# Patient Record
Sex: MALE | Race: WHITE | NOT HISPANIC OR LATINO
[De-identification: names, ages, dates, MRNs, and addresses within clinical notes are randomized per-mention and may not be internally consistent; named-entity substitution may affect disease eponyms.]

---

## 2017-01-23 ENCOUNTER — RX RENEWAL (OUTPATIENT)
Age: 72
End: 2017-01-23

## 2017-02-23 ENCOUNTER — RESULT REVIEW (OUTPATIENT)
Age: 72
End: 2017-02-23

## 2017-03-06 ENCOUNTER — APPOINTMENT (OUTPATIENT)
Dept: FAMILY MEDICINE | Facility: CLINIC | Age: 72
End: 2017-03-06

## 2017-03-06 VITALS
OXYGEN SATURATION: 98 % | DIASTOLIC BLOOD PRESSURE: 80 MMHG | HEIGHT: 66 IN | HEART RATE: 60 BPM | BODY MASS INDEX: 33.77 KG/M2 | SYSTOLIC BLOOD PRESSURE: 158 MMHG | RESPIRATION RATE: 16 BRPM | WEIGHT: 210.13 LBS

## 2017-03-06 VITALS — SYSTOLIC BLOOD PRESSURE: 180 MMHG | DIASTOLIC BLOOD PRESSURE: 100 MMHG

## 2017-04-07 ENCOUNTER — CLINICAL ADVICE (OUTPATIENT)
Age: 72
End: 2017-04-07

## 2017-04-27 ENCOUNTER — RX RENEWAL (OUTPATIENT)
Age: 72
End: 2017-04-27

## 2017-05-15 ENCOUNTER — RX RENEWAL (OUTPATIENT)
Age: 72
End: 2017-05-15

## 2017-05-16 ENCOUNTER — RX RENEWAL (OUTPATIENT)
Age: 72
End: 2017-05-16

## 2017-06-12 ENCOUNTER — NON-APPOINTMENT (OUTPATIENT)
Age: 72
End: 2017-06-12

## 2017-06-12 ENCOUNTER — APPOINTMENT (OUTPATIENT)
Dept: FAMILY MEDICINE | Facility: CLINIC | Age: 72
End: 2017-06-12

## 2017-06-12 VITALS
WEIGHT: 205 LBS | HEART RATE: 76 BPM | SYSTOLIC BLOOD PRESSURE: 130 MMHG | HEIGHT: 66 IN | RESPIRATION RATE: 16 BRPM | DIASTOLIC BLOOD PRESSURE: 72 MMHG | OXYGEN SATURATION: 98 % | BODY MASS INDEX: 32.95 KG/M2

## 2017-06-12 DIAGNOSIS — Z23 ENCOUNTER FOR IMMUNIZATION: ICD-10-CM

## 2017-06-12 RX ORDER — AMLODIPINE BESYLATE 5 MG/1
5 TABLET ORAL
Qty: 30 | Refills: 0 | Status: COMPLETED | COMMUNITY
Start: 2017-03-06 | End: 2017-06-12

## 2017-06-22 LAB — HEMOCCULT STL QL IA: NEGATIVE

## 2017-07-21 ENCOUNTER — RX RENEWAL (OUTPATIENT)
Age: 72
End: 2017-07-21

## 2017-10-03 ENCOUNTER — RX RENEWAL (OUTPATIENT)
Age: 72
End: 2017-10-03

## 2017-10-20 ENCOUNTER — RX RENEWAL (OUTPATIENT)
Age: 72
End: 2017-10-20

## 2017-11-20 ENCOUNTER — RX RENEWAL (OUTPATIENT)
Age: 72
End: 2017-11-20

## 2017-11-27 ENCOUNTER — APPOINTMENT (OUTPATIENT)
Dept: FAMILY MEDICINE | Facility: CLINIC | Age: 72
End: 2017-11-27
Payer: MEDICARE

## 2017-11-27 VITALS — DIASTOLIC BLOOD PRESSURE: 92 MMHG | SYSTOLIC BLOOD PRESSURE: 170 MMHG

## 2017-11-27 VITALS
HEIGHT: 66 IN | HEART RATE: 71 BPM | BODY MASS INDEX: 34.07 KG/M2 | RESPIRATION RATE: 16 BRPM | WEIGHT: 212 LBS | OXYGEN SATURATION: 98 % | SYSTOLIC BLOOD PRESSURE: 172 MMHG | DIASTOLIC BLOOD PRESSURE: 84 MMHG

## 2017-11-27 DIAGNOSIS — Z23 ENCOUNTER FOR IMMUNIZATION: ICD-10-CM

## 2017-11-27 PROCEDURE — 90732 PPSV23 VACC 2 YRS+ SUBQ/IM: CPT

## 2017-11-27 PROCEDURE — G0009: CPT

## 2017-11-27 PROCEDURE — 99214 OFFICE O/P EST MOD 30 MIN: CPT | Mod: 25

## 2017-11-27 RX ORDER — SIMVASTATIN 40 MG/1
40 TABLET, FILM COATED ORAL
Qty: 90 | Refills: 0 | Status: DISCONTINUED | COMMUNITY
Start: 2016-07-07 | End: 2017-11-27

## 2017-11-27 RX ORDER — INSULIN GLARGINE 100 [IU]/ML
100 INJECTION, SOLUTION SUBCUTANEOUS
Refills: 0 | Status: ACTIVE | COMMUNITY

## 2017-11-27 RX ORDER — INSULIN ASPART 100 [IU]/ML
100 INJECTION, SOLUTION INTRAVENOUS; SUBCUTANEOUS
Refills: 0 | Status: ACTIVE | COMMUNITY

## 2017-12-15 ENCOUNTER — APPOINTMENT (OUTPATIENT)
Dept: FAMILY MEDICINE | Facility: CLINIC | Age: 72
End: 2017-12-15
Payer: MEDICARE

## 2017-12-15 VITALS — SYSTOLIC BLOOD PRESSURE: 152 MMHG | DIASTOLIC BLOOD PRESSURE: 82 MMHG

## 2017-12-15 VITALS
BODY MASS INDEX: 34.55 KG/M2 | HEIGHT: 66 IN | SYSTOLIC BLOOD PRESSURE: 150 MMHG | WEIGHT: 215 LBS | DIASTOLIC BLOOD PRESSURE: 90 MMHG

## 2017-12-15 PROCEDURE — 99214 OFFICE O/P EST MOD 30 MIN: CPT

## 2018-03-19 ENCOUNTER — RX RENEWAL (OUTPATIENT)
Age: 73
End: 2018-03-19

## 2018-06-19 ENCOUNTER — APPOINTMENT (OUTPATIENT)
Dept: FAMILY MEDICINE | Facility: CLINIC | Age: 73
End: 2018-06-19
Payer: MEDICARE

## 2018-06-19 VITALS
HEIGHT: 66 IN | DIASTOLIC BLOOD PRESSURE: 82 MMHG | SYSTOLIC BLOOD PRESSURE: 148 MMHG | WEIGHT: 216 LBS | BODY MASS INDEX: 34.72 KG/M2

## 2018-06-19 DIAGNOSIS — N50.89 OTHER SPECIFIED DISORDERS OF THE MALE GENITAL ORGANS: ICD-10-CM

## 2018-06-19 PROCEDURE — 99214 OFFICE O/P EST MOD 30 MIN: CPT

## 2018-06-19 NOTE — HISTORY OF PRESENT ILLNESS
[de-identified] : Pt for f/u DM, htn, gout. Glucose usually <130s, improved from past few weeks since he started eating more celery during day. [facility] Last [facility] check 2 yrs ago. Pt has run out amlodipine, BP elevated today. Pt has not had gouty attack recently.\par Pt c/o fatty mass on R testicle. Denies pain.

## 2018-06-19 NOTE — ASSESSMENT
[FreeTextEntry1] : htn- not at goal, restart amlodipine 10mg q day. f/u for repeat BP check\par DM- stable, cont meds. carb controlled diet. [facility]\par HLD - refill statin, check flp, hepatic function\par gout - refill allopurinol check uric acid\par R testicle swelling - likely benign, pt [facility]

## 2018-09-20 ENCOUNTER — APPOINTMENT (OUTPATIENT)
Dept: FAMILY MEDICINE | Facility: CLINIC | Age: 73
End: 2018-09-20
Payer: MEDICARE

## 2018-09-20 VITALS
OXYGEN SATURATION: 97 % | WEIGHT: 219.13 LBS | BODY MASS INDEX: 35.22 KG/M2 | HEART RATE: 72 BPM | SYSTOLIC BLOOD PRESSURE: 140 MMHG | HEIGHT: 66 IN | DIASTOLIC BLOOD PRESSURE: 68 MMHG

## 2018-09-20 DIAGNOSIS — Z23 ENCOUNTER FOR IMMUNIZATION: ICD-10-CM

## 2018-09-20 PROCEDURE — 99214 OFFICE O/P EST MOD 30 MIN: CPT | Mod: 25

## 2018-09-20 PROCEDURE — 90662 IIV NO PRSV INCREASED AG IM: CPT

## 2018-09-20 PROCEDURE — G0008: CPT

## 2018-09-23 NOTE — ASSESSMENT
[FreeTextEntry1] : gout - refill allopurinol\par HLD - refill statin\par htn- refill norvasc, lisinopril\par Risks and benefits of high dose flu vaccine discussed w/ pt. Consent given by pt, high dose flu vaccine administered. Pt tolerated well\par

## 2018-09-23 NOTE — HISTORY OF PRESENT ILLNESS
[de-identified] : Pt for f/u hld, htn, gout. Pt feels well needs refill of meds. Denies CP, palpitations, dyspnea, n/v\par Pt sometimes feels sporadic "tightness" in L leg first noticed a few weeks ago.

## 2018-12-04 ENCOUNTER — RX CHANGE (OUTPATIENT)
Age: 73
End: 2018-12-04

## 2018-12-13 ENCOUNTER — APPOINTMENT (OUTPATIENT)
Dept: FAMILY MEDICINE | Facility: CLINIC | Age: 73
End: 2018-12-13
Payer: MEDICARE

## 2018-12-13 VITALS
SYSTOLIC BLOOD PRESSURE: 148 MMHG | TEMPERATURE: 98.6 F | OXYGEN SATURATION: 96 % | WEIGHT: 220 LBS | HEIGHT: 66 IN | RESPIRATION RATE: 16 BRPM | HEART RATE: 97 BPM | BODY MASS INDEX: 35.36 KG/M2 | DIASTOLIC BLOOD PRESSURE: 78 MMHG

## 2018-12-13 PROCEDURE — 99214 OFFICE O/P EST MOD 30 MIN: CPT

## 2018-12-14 NOTE — HISTORY OF PRESENT ILLNESS
[de-identified] : Pt for f/u DM, hld, htn, gout. Pt feels well needs refill of meds. Glucose usually 130s. Last hba1c in 07/2018 well controlled 6.9. Eye exam 10/2018. Denies CP, palpitations, dyspnea, n/v

## 2018-12-14 NOTE — ASSESSMENT
[FreeTextEntry1] : DM - cont lantus, novolog. f/u with endo.\par gout - refill allopurinol\par HLD - refill simvastatin\par htn- refill norvasc, lisinopril\par pt [facility]

## 2019-02-21 ENCOUNTER — RX RENEWAL (OUTPATIENT)
Age: 74
End: 2019-02-21

## 2019-03-25 ENCOUNTER — APPOINTMENT (OUTPATIENT)
Dept: FAMILY MEDICINE | Facility: CLINIC | Age: 74
End: 2019-03-25
Payer: MEDICARE

## 2019-03-25 VITALS
TEMPERATURE: 97.9 F | WEIGHT: 205 LBS | BODY MASS INDEX: 32.95 KG/M2 | DIASTOLIC BLOOD PRESSURE: 70 MMHG | SYSTOLIC BLOOD PRESSURE: 140 MMHG | HEIGHT: 66 IN | OXYGEN SATURATION: 95 % | HEART RATE: 86 BPM

## 2019-03-25 DIAGNOSIS — Z12.12 ENCOUNTER FOR SCREENING FOR MALIGNANT NEOPLASM OF COLON: ICD-10-CM

## 2019-03-25 DIAGNOSIS — Z12.11 ENCOUNTER FOR SCREENING FOR MALIGNANT NEOPLASM OF COLON: ICD-10-CM

## 2019-03-25 PROCEDURE — 99214 OFFICE O/P EST MOD 30 MIN: CPT

## 2019-03-25 NOTE — ASSESSMENT
[FreeTextEntry1] : DM - cont lantus, novolog. f/u with endo.\par gout - refill allopurinol\par HLD - refill simvastatin\par htn- refill norvasc, lisinopril\par pt [facility]\par FOBT ordered

## 2019-03-25 NOTE — HISTORY OF PRESENT ILLNESS
[de-identified] : Pt for f/u DM, hld, htn, gout. Pt feels well needs refill of meds. AM glucose usually 80-120s. Last hba1c in 07/2018 well controlled 6.9. Eye exam 10/2018. [facility] and will do complete bloodwork at that time. Denies CP, palpitations, dyspnea, n/v

## 2019-06-17 ENCOUNTER — RX RENEWAL (OUTPATIENT)
Age: 74
End: 2019-06-17

## 2019-06-18 ENCOUNTER — RX RENEWAL (OUTPATIENT)
Age: 74
End: 2019-06-18

## 2019-08-23 ENCOUNTER — RX RENEWAL (OUTPATIENT)
Age: 74
End: 2019-08-23

## 2019-12-10 ENCOUNTER — NON-APPOINTMENT (OUTPATIENT)
Age: 74
End: 2019-12-10

## 2019-12-10 ENCOUNTER — APPOINTMENT (OUTPATIENT)
Dept: FAMILY MEDICINE | Facility: CLINIC | Age: 74
End: 2019-12-10
Payer: MEDICARE

## 2019-12-10 VITALS
DIASTOLIC BLOOD PRESSURE: 84 MMHG | HEIGHT: 66.25 IN | SYSTOLIC BLOOD PRESSURE: 146 MMHG | WEIGHT: 218.38 LBS | HEART RATE: 95 BPM | BODY MASS INDEX: 35.1 KG/M2 | OXYGEN SATURATION: 96 %

## 2019-12-10 DIAGNOSIS — R25.2 CRAMP AND SPASM: ICD-10-CM

## 2019-12-10 PROCEDURE — G0439: CPT

## 2019-12-10 PROCEDURE — 93000 ELECTROCARDIOGRAM COMPLETE: CPT | Mod: 59

## 2019-12-11 PROBLEM — R25.2 LEG CRAMPS: Status: ACTIVE | Noted: 2019-12-11

## 2019-12-11 NOTE — HEALTH RISK ASSESSMENT
[Very Good] : ~his/her~  mood as very good [26-29] : 26-30 [No] : No [1 or 2 (0 pts)] : 1 or 2 (0 points) [Never (0 pts)] : Never (0 points) [No falls in past year] : Patient reported no falls in the past year [0] : 2) Feeling down, depressed, or hopeless: Not at all (0) [None] : None [With Significant Other] : lives with significant other [] :  [Feels Safe at Home] : Feels safe at home [Fully functional (bathing, dressing, toileting, transferring, walking, feeding)] : Fully functional (bathing, dressing, toileting, transferring, walking, feeding) [Fully functional (using the telephone, shopping, preparing meals, housekeeping, doing laundry, using] : Fully functional and needs no help or supervision to perform IADLs (using the telephone, shopping, preparing meals, housekeeping, doing laundry, using transportation, managing medications and managing finances) [Reports normal functional visual acuity (ie: able to read med bottle)] : Reports normal functional visual acuity [Smoke Detector] : smoke detector [Carbon Monoxide Detector] : carbon monoxide detector [Seat Belt] :  uses seat belt [Designated Healthcare Proxy] : Designated healthcare proxy [With Patient/Caregiver] : With Patient/Caregiver [Name: ___] : Health Care Proxy's Name: [unfilled]  [Relationship: ___] : Relationship: [unfilled] [Aggressive treatment] : aggressive treatment [de-identified] : exsmoker 1ppd x 28 yr [YearQuit] : 1991 [[id number]] : 0 [Change in mental status noted] : No change in mental status noted [Language] : denies difficulty with language [Behavior] : denies difficulty with behavior [Learning/Retaining New Information] : denies difficulty learning/retaining new information [Handling Complex Tasks] : denies difficulty handling complex tasks [Reasoning] : denies difficulty with reasoning [Spatial Ability and Orientation] : denies difficulty with spatial ability and orientation [Reports changes in hearing] : Reports no changes in hearing [Reports changes in vision] : Reports no changes in vision [de-identified] : lives w wife [FreeTextEntry2] : part time  [AdvancecareDate] : 12/19

## 2019-12-11 NOTE — ASSESSMENT
[FreeTextEntry1] : DM - cont lantus, novolog. start jardiance 10mg q day. [facility]\par gout - refill allopurinol\par HLD - refill simvastatin\par htn- refill norvasc, lisinopril\par leg cramps - advised hydration, stretching\par pt [facility]

## 2019-12-11 NOTE — HISTORY OF PRESENT ILLNESS
[de-identified] : Pt in office for AWV.\par Pt for f/u DM, hld, htn, gout. Pt feels well needs refill of meds. AM glucose usually 130s. Last hba1c in 10/29/19 increased to 7.2. Eye exam 09/2019, no retinopathy. Pt on lantus 35 units qhs, novolog 8 units w/ meals. [facility] and will do complete bloodwork at that time. \par Pt c/o L calf pain at night for several months. Denies swelling, erythema, trauma to leg, or pain with walking.\par Denies CP, palpitations, dyspnea, n/v\par Reviewed 10/29/19 labs w/ pt [facility]\par declines shingrix

## 2020-05-01 ENCOUNTER — APPOINTMENT (OUTPATIENT)
Dept: FAMILY MEDICINE | Facility: CLINIC | Age: 75
End: 2020-05-01
Payer: MEDICARE

## 2020-05-01 ENCOUNTER — NON-APPOINTMENT (OUTPATIENT)
Age: 75
End: 2020-05-01

## 2020-05-01 VITALS
HEIGHT: 66 IN | BODY MASS INDEX: 32.95 KG/M2 | RESPIRATION RATE: 16 BRPM | DIASTOLIC BLOOD PRESSURE: 80 MMHG | SYSTOLIC BLOOD PRESSURE: 150 MMHG | HEART RATE: 70 BPM | OXYGEN SATURATION: 96 % | WEIGHT: 205 LBS

## 2020-05-01 VITALS — SYSTOLIC BLOOD PRESSURE: 134 MMHG | DIASTOLIC BLOOD PRESSURE: 76 MMHG

## 2020-05-01 PROCEDURE — 99214 OFFICE O/P EST MOD 30 MIN: CPT | Mod: 25

## 2020-05-01 PROCEDURE — 93000 ELECTROCARDIOGRAM COMPLETE: CPT

## 2020-05-01 RX ORDER — METOPROLOL SUCCINATE 50 MG/1
50 TABLET, EXTENDED RELEASE ORAL
Refills: 0 | Status: ACTIVE | COMMUNITY

## 2020-05-01 RX ORDER — INSULIN ADMIN. SUPPLIES
30G X 8 MM INSULIN PEN (EA) SUBCUTANEOUS
Qty: 1 | Refills: 2 | Status: ACTIVE | COMMUNITY
Start: 2015-12-07 | End: 1900-01-01

## 2020-05-01 RX ORDER — ATORVASTATIN CALCIUM 80 MG/1
80 TABLET, FILM COATED ORAL
Refills: 0 | Status: ACTIVE | COMMUNITY

## 2020-05-01 RX ORDER — TICAGRELOR 90 MG/1
90 TABLET ORAL
Refills: 0 | Status: ACTIVE | COMMUNITY

## 2020-05-01 RX ORDER — EMPAGLIFLOZIN 10 MG/1
10 TABLET, FILM COATED ORAL DAILY
Qty: 90 | Refills: 0 | Status: COMPLETED | COMMUNITY
Start: 2019-12-10 | End: 2020-05-01

## 2020-05-04 NOTE — ASSESSMENT
[FreeTextEntry1] : elev serum Cr - recheck bmp, US kidneys\par CAD s/p stent x2, htn - f/u with cardio\par DM- jardiance d/c'ed due to renal dysfunction, cont lantus and novolog. monitor glucose, will recheck hba1c in 3 months as hba1c in hospital was 6.3

## 2020-06-26 RX ORDER — COLCHICINE 0.6 MG/1
0.6 CAPSULE ORAL
Qty: 30 | Refills: 0 | Status: ACTIVE | COMMUNITY
Start: 2020-06-26 | End: 1900-01-01

## 2020-10-19 ENCOUNTER — RX RENEWAL (OUTPATIENT)
Age: 75
End: 2020-10-19

## 2020-12-28 ENCOUNTER — APPOINTMENT (OUTPATIENT)
Dept: FAMILY MEDICINE | Facility: CLINIC | Age: 75
End: 2020-12-28
Payer: MEDICARE

## 2020-12-28 VITALS
SYSTOLIC BLOOD PRESSURE: 138 MMHG | BODY MASS INDEX: 33.27 KG/M2 | HEIGHT: 66 IN | DIASTOLIC BLOOD PRESSURE: 76 MMHG | HEART RATE: 81 BPM | TEMPERATURE: 97.6 F | OXYGEN SATURATION: 98 % | WEIGHT: 207 LBS

## 2020-12-28 DIAGNOSIS — G56.91 UNSPECIFIED MONONEUROPATHY OF RIGHT UPPER LIMB: ICD-10-CM

## 2020-12-28 PROCEDURE — 99214 OFFICE O/P EST MOD 30 MIN: CPT

## 2020-12-28 NOTE — ASSESSMENT
[FreeTextEntry1] : htn- refill norvasc\par gout- refill allopurinol\par neuropathy of R hand - refer to neuro for eval

## 2020-12-28 NOTE — HISTORY OF PRESENT ILLNESS
[de-identified] : Pt for f/u htn, gout, CAD. Pt is s/p NSTEMI in 04/2020. Pt feels well needs refill of gout and htn meds. AM glucose usually 130s. Last hba1c in 10/2020 was 6.8. Eye exam 09/2019, no retinopathy. Pt on lantus 35 units qhs, novolog 8 units w/ meals. Pt reports [facility]. Pt was found to have CKD, had renal US last month.\par \par Pt c/o pinching sensation at base of R index finger and thumb that he woke up with x 1 week. This started about 1 week ago and also starting to get numb at times. Denies trauma. Symptoms worse w/ finger extension.

## 2021-03-17 ENCOUNTER — APPOINTMENT (OUTPATIENT)
Dept: FAMILY MEDICINE | Facility: CLINIC | Age: 76
End: 2021-03-17
Payer: MEDICARE

## 2021-03-17 VITALS
HEIGHT: 66 IN | HEART RATE: 75 BPM | SYSTOLIC BLOOD PRESSURE: 140 MMHG | TEMPERATURE: 97.2 F | OXYGEN SATURATION: 98 % | BODY MASS INDEX: 32.95 KG/M2 | WEIGHT: 205 LBS | DIASTOLIC BLOOD PRESSURE: 70 MMHG

## 2021-03-17 DIAGNOSIS — M25.561 PAIN IN RIGHT KNEE: ICD-10-CM

## 2021-03-17 PROCEDURE — 99213 OFFICE O/P EST LOW 20 MIN: CPT | Mod: 25

## 2021-03-17 PROCEDURE — G0444 DEPRESSION SCREEN ANNUAL: CPT | Mod: 59

## 2021-03-17 RX ORDER — DICLOFENAC SODIUM 1% 10 MG/G
1 GEL TOPICAL
Qty: 1 | Refills: 0 | Status: ACTIVE | COMMUNITY
Start: 2021-03-17 | End: 1900-01-01

## 2021-03-17 NOTE — PHYSICAL EXAM
[Normal] : no acute distress, well nourished, well developed and well-appearing [de-identified] : pain and decreased rom of right knee

## 2021-03-30 ENCOUNTER — APPOINTMENT (OUTPATIENT)
Dept: ORTHOPEDIC SURGERY | Facility: CLINIC | Age: 76
End: 2021-03-30
Payer: MEDICARE

## 2021-03-30 VITALS
DIASTOLIC BLOOD PRESSURE: 62 MMHG | HEIGHT: 66 IN | SYSTOLIC BLOOD PRESSURE: 175 MMHG | BODY MASS INDEX: 32.14 KG/M2 | HEART RATE: 85 BPM | WEIGHT: 200 LBS

## 2021-03-30 PROCEDURE — 20610 DRAIN/INJ JOINT/BURSA W/O US: CPT | Mod: RT

## 2021-03-30 PROCEDURE — 76942 ECHO GUIDE FOR BIOPSY: CPT | Mod: 59

## 2021-03-30 PROCEDURE — 99204 OFFICE O/P NEW MOD 45 MIN: CPT | Mod: 25

## 2021-03-30 PROCEDURE — 73564 X-RAY EXAM KNEE 4 OR MORE: CPT | Mod: RT

## 2021-03-30 NOTE — PROCEDURE
[de-identified] : Using sterile technique, 2cc of depomedrol 40mg/ml, 4cc of 1% plain lidocaine, and 2 cc 0.25% marcaine was drawn up into a sterile syringe. The right knee joint space was identified using ultrasound. The right knee was then sterilely prepped with chlorhexidine. Ethyl chloride spray was used to anesthetize the skin and subQ tissue. The depomedrol/lidocaine/marcaine mixture was then injected into the knee joint in the superolateral position under ultrasound guidance and the needle position in the joint space was confirmed. The patient tolerated the procedure well without difficulty. The patient was given instructions on the use of ice and anti-inflammatories post injection site soreness.

## 2021-03-30 NOTE — DISCUSSION/SUMMARY
[de-identified] : The patient is a 75 year old male with mild medial and patellofemoral compartment arthritis of the right knee.  He has a recent flare which could also be associated with his history of gout.  Conservative options were discussed. He was given a cortisone injection in the right knee under ultrasound-guidance. He was given a prescription for physical therapy. He was recommended to wait 1 week following the injection to begin physical therapy.  He is unable to take anti-inflammatories because he is on anticoagulation.  He may follow-up in 2 months for reevaluation

## 2021-03-30 NOTE — HISTORY OF PRESENT ILLNESS
[de-identified] : TANMAY is a 75 year old male who presents today for initial evaluation of right knee pain. Patient has had a few months of right knee pain, beginning 1-2 months ago when he had a flare up of gout in the right knee. He denies any falls or trauma. He denies having gout issues with the knee in the past, but has had flair ups in the ankle and the elbow. Patient takes allopurinol for gout. He now has since had some pain to the anterior aspect as well as the knee cap of the right knee. Patient states the pain is worse with walking excessive distance, as well as towards the end of the night. Patient has not had PT or injections. He is taking tylenol.

## 2021-03-30 NOTE — CONSULT LETTER
[Dear  ___] : Dear  [unfilled], [Consult Letter:] : I had the pleasure of evaluating your patient, [unfilled]. [Please see my note below.] : Please see my note below. [Consult Closing:] : Thank you very much for allowing me to participate in the care of this patient.  If you have any questions, please do not hesitate to contact me. [Sincerely,] : Sincerely, [FreeTextEntry2] : JOHN REYES\par  [FreeTextEntry3] : Jeovanny Arndt MD\par Chief of Joint Replacement\par [facility]\par [facility]\par

## 2021-03-30 NOTE — ADDENDUM
[FreeTextEntry1] : This note was authored by Khalif Pina working as a medical scribe for Dr. Jeovanny Arndt. The note was reviewed, edited, and revised by Dr. Jeovanny Arndt whom is in agreement with the exam findings, imaging findings, and treatment plan. 03/30/2021.

## 2021-03-30 NOTE — PHYSICAL EXAM
[de-identified] : The patient appears well nourished  and in no apparent distress.  The patient is alert and oriented to person, place, and time.   Affect and mood appear normal. The head is normocephalic and atraumatic.  The eyes reveal normal sclera and extra ocular muscles are intact. The tongue is midline with no apparent lesions.  Skin shows normal turgor with no evidence of eczema or psoriasis.  No respiratory distress noted.  Sensation grossly intact.\par   [de-identified] : Exam of the right knee shows no effusion, full extension, 120 degrees of flexion. 5/5 motor strength bilaterally distally. Sensation intact distally.  [de-identified] : Xray- 4 views of the right knee shows mild medial and patellofemoral compartment arthritis of the right knee.

## 2021-06-22 ENCOUNTER — RX RENEWAL (OUTPATIENT)
Age: 76
End: 2021-06-22

## 2021-06-22 RX ORDER — AMLODIPINE BESYLATE 10 MG/1
10 TABLET ORAL DAILY
Qty: 90 | Refills: 0 | Status: ACTIVE | COMMUNITY
Start: 2017-11-27 | End: 1900-01-01

## 2021-08-18 ENCOUNTER — RX RENEWAL (OUTPATIENT)
Age: 76
End: 2021-08-18

## 2021-08-24 ENCOUNTER — APPOINTMENT (OUTPATIENT)
Dept: FAMILY MEDICINE | Facility: CLINIC | Age: 76
End: 2021-08-24
Payer: MEDICARE

## 2021-08-24 VITALS
HEIGHT: 66 IN | WEIGHT: 205 LBS | OXYGEN SATURATION: 97 % | HEART RATE: 87 BPM | SYSTOLIC BLOOD PRESSURE: 160 MMHG | TEMPERATURE: 98 F | BODY MASS INDEX: 32.95 KG/M2 | DIASTOLIC BLOOD PRESSURE: 76 MMHG

## 2021-08-24 DIAGNOSIS — M10.9 GOUT, UNSPECIFIED: ICD-10-CM

## 2021-08-24 DIAGNOSIS — E78.5 HYPERLIPIDEMIA, UNSPECIFIED: ICD-10-CM

## 2021-08-24 DIAGNOSIS — Z95.5 ATHEROSCLEROTIC HEART DISEASE OF NATIVE CORONARY ARTERY W/OUT ANGINA PECTORIS: ICD-10-CM

## 2021-08-24 DIAGNOSIS — I10 ESSENTIAL (PRIMARY) HYPERTENSION: ICD-10-CM

## 2021-08-24 DIAGNOSIS — R79.89 OTHER SPECIFIED ABNORMAL FINDINGS OF BLOOD CHEMISTRY: ICD-10-CM

## 2021-08-24 DIAGNOSIS — I25.10 ATHEROSCLEROTIC HEART DISEASE OF NATIVE CORONARY ARTERY W/OUT ANGINA PECTORIS: ICD-10-CM

## 2021-08-24 DIAGNOSIS — E11.9 TYPE 2 DIABETES MELLITUS W/OUT COMPLICATIONS: ICD-10-CM

## 2021-08-24 DIAGNOSIS — Z00.00 ENCOUNTER FOR GENERAL ADULT MEDICAL EXAMINATION W/OUT ABNORMAL FINDINGS: ICD-10-CM

## 2021-08-24 PROCEDURE — G0439: CPT

## 2021-08-24 RX ORDER — LISINOPRIL 10 MG/1
10 TABLET ORAL DAILY
Qty: 30 | Refills: 0 | Status: ACTIVE | COMMUNITY
Start: 2021-08-24

## 2021-08-25 RX ORDER — AMOXICILLIN 500 MG/1
500 CAPSULE ORAL
Qty: 21 | Refills: 0 | Status: COMPLETED | COMMUNITY
Start: 2021-07-15

## 2021-08-25 RX ORDER — CLINDAMYCIN HYDROCHLORIDE 150 MG/1
150 CAPSULE ORAL
Qty: 21 | Refills: 0 | Status: COMPLETED | COMMUNITY
Start: 2021-08-05

## 2021-08-25 RX ORDER — IBUPROFEN 600 MG/1
600 TABLET, FILM COATED ORAL
Qty: 28 | Refills: 0 | Status: COMPLETED | COMMUNITY
Start: 2021-08-05

## 2021-08-25 NOTE — HISTORY OF PRESENT ILLNESS
[de-identified] : Pt in office for AWV.\par Pt for f/u htn, gout, CAD. Pt is s/p NSTEMI in 04/2020, pt sees Dr. Parrish ([facility]), last seen 06/2021. Nuclear stress test was stable. AM glucose usually 120-130s. Last hba1c in 10/2020 was 6.8. Eye exam 08/2021, no retinopathy. Pt on lantus 35 units qhs, novolog 8 units w/ meals. 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

## 2021-08-25 NOTE — HEALTH RISK ASSESSMENT
[Very Good] : ~his/her~  mood as very good [No] : No [1 or 2 (0 pts)] : 1 or 2 (0 points) [Never (0 pts)] : Never (0 points) [No falls in past year] : Patient reported no falls in the past year [0] : 2) Feeling down, depressed, or hopeless: Not at all (0) [None] : None [With Significant Other] : lives with significant other [] :  [Feels Safe at Home] : Feels safe at home [Fully functional (bathing, dressing, toileting, transferring, walking, feeding)] : Fully functional (bathing, dressing, toileting, transferring, walking, feeding) [Fully functional (using the telephone, shopping, preparing meals, housekeeping, doing laundry, using] : Fully functional and needs no help or supervision to perform IADLs (using the telephone, shopping, preparing meals, housekeeping, doing laundry, using transportation, managing medications and managing finances) [Reports normal functional visual acuity (ie: able to read med bottle)] : Reports normal functional visual acuity [Smoke Detector] : smoke detector [Carbon Monoxide Detector] : carbon monoxide detector [Seat Belt] :  uses seat belt [With Patient/Caregiver] : , with patient/caregiver [Reviewed no changes] : Reviewed, no changes [Designated Healthcare Proxy] : Designated healthcare proxy [Name: ___] : Health Care Proxy's Name: [unfilled]  [Relationship: ___] : Relationship: [unfilled] [Aggressive treatment] : aggressive treatment [PHQ-2 Negative - No further assessment needed] : PHQ-2 Negative - No further assessment needed [No Retinopathy] : No retinopathy [Retired] : retired [de-identified] : exsmoker 1ppd x 28 yr [YearQuit] : 1991 [EDH6Hmaxo] : 0 [EyeExamDate] : 08/21 [Change in mental status noted] : No change in mental status noted [Language] : denies difficulty with language [Behavior] : denies difficulty with behavior [Learning/Retaining New Information] : denies difficulty learning/retaining new information [Handling Complex Tasks] : denies difficulty handling complex tasks [Reasoning] : denies difficulty with reasoning [Spatial Ability and Orientation] : denies difficulty with spatial ability and orientation [Reports changes in hearing] : Reports no changes in hearing [Reports changes in vision] : Reports no changes in vision [Reports changes in dental health] : Reports no changes in dental health [ColonoscopyComments] : will do cologuard [de-identified] : lives w wife [AdvancecareDate] : 08/21

## 2021-08-25 NOTE — ASSESSMENT
[FreeTextEntry1] : DM - cont lantus, novolog [facility]\par gout - cont allopurinol\par HLD - cont statin\par htn- cont norvasc, lisinopril, metoprolol\par CAD s/p stent x2 - cont meds. Check labs. f/u cardio\par pt [facility]\par cologuard ordered

## 2021-09-25 LAB — HBA1C MFR BLD HPLC: 6.6

## 2021-10-11 LAB — NONINV COLON CA DNA+OCC BLD SCRN STL QL: NORMAL

## 2021-11-15 ENCOUNTER — APPOINTMENT (OUTPATIENT)
Dept: FAMILY MEDICINE | Facility: CLINIC | Age: 76
End: 2021-11-15
Payer: MEDICARE

## 2021-11-15 DIAGNOSIS — J01.00 ACUTE MAXILLARY SINUSITIS, UNSPECIFIED: ICD-10-CM

## 2021-11-15 PROCEDURE — 99441: CPT | Mod: 95

## 2021-11-16 ENCOUNTER — RX RENEWAL (OUTPATIENT)
Age: 76
End: 2021-11-16

## 2021-11-16 RX ORDER — FLUTICASONE PROPIONATE 50 UG/1
50 SPRAY, METERED NASAL
Qty: 1 | Refills: 0 | Status: ACTIVE | COMMUNITY
Start: 2021-11-15 | End: 1900-01-01

## 2021-11-22 RX ORDER — AMOXICILLIN AND CLAVULANATE POTASSIUM 875; 125 MG/1; MG/1
875-125 TABLET, COATED ORAL
Qty: 14 | Refills: 0 | Status: ACTIVE | COMMUNITY
Start: 2021-11-22 | End: 1900-01-01

## 2025-02-23 NOTE — HISTORY OF PRESENT ILLNESS
[de-identified] : Pt f/u hospitalization in  hospital 4/22-4/24/20 for NSTEMI. Pt had PCI stent x 2 to mRCA. Pt was switched from zocor to lipitor 80mg. Pt was started on metoprolol ER 50mg q day ticagrelo 90 mg bid, nitroglycerin 0.4mg sl x 3 prn. Pt to f/u with Dr. Shivani greene on 5/7/20. \par Jardiance and lisinopril were d/c'ed for elevated Cr. hba1c in hospital was 6.3. Pt has lost 13 lbs since last visit 5 months ago through portion controlled diet. Due to recheck bmp for renal function\par 
No/Unable to asses